# Patient Record
Sex: FEMALE | Race: BLACK OR AFRICAN AMERICAN | NOT HISPANIC OR LATINO
[De-identification: names, ages, dates, MRNs, and addresses within clinical notes are randomized per-mention and may not be internally consistent; named-entity substitution may affect disease eponyms.]

---

## 2017-01-26 ENCOUNTER — RESULT REVIEW (OUTPATIENT)
Age: 82
End: 2017-01-26

## 2017-01-30 ENCOUNTER — OUTPATIENT (OUTPATIENT)
Dept: OUTPATIENT SERVICES | Facility: HOSPITAL | Age: 82
LOS: 1 days | End: 2017-01-30

## 2017-01-30 DIAGNOSIS — Z98.89 OTHER SPECIFIED POSTPROCEDURAL STATES: Chronic | ICD-10-CM

## 2017-01-30 DIAGNOSIS — Z90.710 ACQUIRED ABSENCE OF BOTH CERVIX AND UTERUS: Chronic | ICD-10-CM

## 2017-01-30 DIAGNOSIS — Z00.8 ENCOUNTER FOR OTHER GENERAL EXAMINATION: ICD-10-CM

## 2017-02-03 ENCOUNTER — APPOINTMENT (OUTPATIENT)
Dept: MAMMOGRAPHY | Facility: IMAGING CENTER | Age: 82
End: 2017-02-03

## 2017-02-03 ENCOUNTER — OUTPATIENT (OUTPATIENT)
Dept: OUTPATIENT SERVICES | Facility: HOSPITAL | Age: 82
LOS: 1 days | End: 2017-02-03
Payer: MEDICARE

## 2017-02-03 VITALS
SYSTOLIC BLOOD PRESSURE: 150 MMHG | RESPIRATION RATE: 16 BRPM | HEIGHT: 68 IN | HEART RATE: 68 BPM | WEIGHT: 153 LBS | TEMPERATURE: 98 F | DIASTOLIC BLOOD PRESSURE: 80 MMHG

## 2017-02-03 DIAGNOSIS — C50.411 MALIGNANT NEOPLASM OF UPPER-OUTER QUADRANT OF RIGHT FEMALE BREAST: ICD-10-CM

## 2017-02-03 DIAGNOSIS — Z90.710 ACQUIRED ABSENCE OF BOTH CERVIX AND UTERUS: Chronic | ICD-10-CM

## 2017-02-03 DIAGNOSIS — M20.40 OTHER HAMMER TOE(S) (ACQUIRED), UNSPECIFIED FOOT: Chronic | ICD-10-CM

## 2017-02-03 DIAGNOSIS — Z98.89 OTHER SPECIFIED POSTPROCEDURAL STATES: Chronic | ICD-10-CM

## 2017-02-03 DIAGNOSIS — N63 UNSPECIFIED LUMP IN BREAST: ICD-10-CM

## 2017-02-03 DIAGNOSIS — C50.919 MALIGNANT NEOPLASM OF UNSPECIFIED SITE OF UNSPECIFIED FEMALE BREAST: ICD-10-CM

## 2017-02-03 LAB
ALBUMIN SERPL ELPH-MCNC: 3.8 G/DL — SIGNIFICANT CHANGE UP (ref 3.3–5)
ALP SERPL-CCNC: 64 U/L — SIGNIFICANT CHANGE UP (ref 40–120)
ALT FLD-CCNC: 28 U/L — SIGNIFICANT CHANGE UP (ref 4–33)
AST SERPL-CCNC: 19 U/L — SIGNIFICANT CHANGE UP (ref 4–32)
BILIRUB SERPL-MCNC: 0.6 MG/DL — SIGNIFICANT CHANGE UP (ref 0.2–1.2)
BUN SERPL-MCNC: 14 MG/DL — SIGNIFICANT CHANGE UP (ref 7–23)
CALCIUM SERPL-MCNC: 9.5 MG/DL — SIGNIFICANT CHANGE UP (ref 8.4–10.5)
CHLORIDE SERPL-SCNC: 101 MMOL/L — SIGNIFICANT CHANGE UP (ref 98–107)
CO2 SERPL-SCNC: 28 MMOL/L — SIGNIFICANT CHANGE UP (ref 22–31)
CREAT SERPL-MCNC: 0.75 MG/DL — SIGNIFICANT CHANGE UP (ref 0.5–1.3)
GLUCOSE SERPL-MCNC: 70 MG/DL — SIGNIFICANT CHANGE UP (ref 70–99)
HCT VFR BLD CALC: 37.6 % — SIGNIFICANT CHANGE UP (ref 34.5–45)
HGB BLD-MCNC: 12 G/DL — SIGNIFICANT CHANGE UP (ref 11.5–15.5)
MCHC RBC-ENTMCNC: 27.5 PG — SIGNIFICANT CHANGE UP (ref 27–34)
MCHC RBC-ENTMCNC: 31.9 % — LOW (ref 32–36)
MCV RBC AUTO: 86 FL — SIGNIFICANT CHANGE UP (ref 80–100)
PLATELET # BLD AUTO: 245 K/UL — SIGNIFICANT CHANGE UP (ref 150–400)
PMV BLD: 12.4 FL — SIGNIFICANT CHANGE UP (ref 7–13)
POTASSIUM SERPL-MCNC: 4.3 MMOL/L — SIGNIFICANT CHANGE UP (ref 3.5–5.3)
POTASSIUM SERPL-SCNC: 4.3 MMOL/L — SIGNIFICANT CHANGE UP (ref 3.5–5.3)
PROT SERPL-MCNC: 7.1 G/DL — SIGNIFICANT CHANGE UP (ref 6–8.3)
RBC # BLD: 4.37 M/UL — SIGNIFICANT CHANGE UP (ref 3.8–5.2)
RBC # FLD: 14.3 % — SIGNIFICANT CHANGE UP (ref 10.3–14.5)
SODIUM SERPL-SCNC: 141 MMOL/L — SIGNIFICANT CHANGE UP (ref 135–145)
WBC # BLD: 6.3 K/UL — SIGNIFICANT CHANGE UP (ref 3.8–10.5)
WBC # FLD AUTO: 6.3 K/UL — SIGNIFICANT CHANGE UP (ref 3.8–10.5)

## 2017-02-03 PROCEDURE — 19281 PERQ DEVICE BREAST 1ST IMAG: CPT

## 2017-02-03 PROCEDURE — C1739: CPT

## 2017-02-03 PROCEDURE — 93010 ELECTROCARDIOGRAM REPORT: CPT

## 2017-02-03 RX ORDER — SODIUM CHLORIDE 9 MG/ML
1000 INJECTION, SOLUTION INTRAVENOUS
Qty: 0 | Refills: 0 | Status: DISCONTINUED | OUTPATIENT
Start: 2017-02-07 | End: 2017-02-22

## 2017-02-03 RX ORDER — ESOMEPRAZOLE MAGNESIUM 40 MG/1
1 CAPSULE, DELAYED RELEASE ORAL
Qty: 0 | Refills: 0 | COMMUNITY

## 2017-02-03 RX ORDER — MULTIVIT-MIN/FERROUS GLUCONATE 9 MG/15 ML
1 LIQUID (ML) ORAL
Qty: 0 | Refills: 0 | COMMUNITY

## 2017-02-03 RX ORDER — ATORVASTATIN CALCIUM 80 MG/1
1 TABLET, FILM COATED ORAL
Qty: 0 | Refills: 0 | COMMUNITY

## 2017-02-03 NOTE — H&P PST ADULT - NEGATIVE ENMT SYMPTOMS
no recurrent cold sores/no ear pain/no post-nasal discharge/no dysphagia/no nasal discharge/no nasal congestion/no throat pain/no dry mouth/no nasal obstruction/no tinnitus/no abnormal taste sensation/no sinus symptoms/no vertigo/no gum bleeding/no nose bleeds/no hearing difficulty

## 2017-02-03 NOTE — H&P PST ADULT - HISTORY OF PRESENT ILLNESS
81 yrs old female with h/o malignant neoplasm of right breast and ? mass in the left breast presents for preop eval to have 81 yrs old female with h/o malignant neoplasm of right breast and ? mass in the left breast presents for preop eval to have mammo localization, right breast lumpectomy of calcification,  and right breast excision of papilloma x 2 ; mammo localization of left breast, and excision of atypical lobular hyperplasia and right breast sentinel lymph node biopsy on 2/7/17. Pt stated that routine mammo in Nov 2015 showed abnormal finding in bilat breast. Pt had sono and biopsies and now going for surgery

## 2017-02-03 NOTE — H&P PST ADULT - NEGATIVE NEUROLOGICAL SYMPTOMS
no confusion/no hemiparesis/no difficulty walking/no facial palsy/no headache/no transient paralysis

## 2017-02-03 NOTE — H&P PST ADULT - NSANTHOSAYNRD_GEN_A_CORE
No. LIZETH screening performed.  STOP BANG Legend: 0-2 = LOW Risk; 3-4 = INTERMEDIATE Risk; 5-8 = HIGH Risk/never tested

## 2017-02-03 NOTE — H&P PST ADULT - PROBLEM SELECTOR PLAN 1
scheduled for mammo localization, right breast lumpectomy of calcification,  and right breast excision of papilloma x 2 ; mammo localization of left breast, and excision of atypical lobular hyperplasia and right breast sentinel lymph node biopsy on 2/7/17.  labs pending   EKG in chart. Preop instructions provided to pt.

## 2017-02-03 NOTE — H&P PST ADULT - PMH
GERD (gastroesophageal reflux disease)    Hammer toes of both feet    HLD (hyperlipidemia)    Malignant neoplasm of breast  right breast  Osteoarthritis    Uterine leiomyoma

## 2017-02-03 NOTE — H&P PST ADULT - ASSESSMENT
81 yrs old female with h/o malignant neoplasm of right breast and ? mass in the left breast presents for preop eval to have mammo localization, right breast lumpectomy of calcification,  and right breast excision of papilloma x 2 ; mammo localization of left breast, and excision of atypical lobular hyperplasia and right breast sentinel lymph node biopsy on 2/7/17.

## 2017-02-03 NOTE — H&P PST ADULT - PSH
Acquired hammer toe  had surgery bilat feet in 2002 and 2003  H/O hammer toe correction    H/O total hysterectomy  1985  History of hysterectomy

## 2017-02-06 ENCOUNTER — RESULT REVIEW (OUTPATIENT)
Age: 82
End: 2017-02-06

## 2017-02-07 ENCOUNTER — APPOINTMENT (OUTPATIENT)
Dept: NUCLEAR MEDICINE | Facility: HOSPITAL | Age: 82
End: 2017-02-07

## 2017-02-07 ENCOUNTER — OUTPATIENT (OUTPATIENT)
Dept: OUTPATIENT SERVICES | Facility: HOSPITAL | Age: 82
LOS: 1 days | Discharge: HOME | End: 2017-02-07
Payer: MEDICARE

## 2017-02-07 VITALS
HEART RATE: 57 BPM | RESPIRATION RATE: 16 BRPM | SYSTOLIC BLOOD PRESSURE: 157 MMHG | TEMPERATURE: 98 F | OXYGEN SATURATION: 100 % | DIASTOLIC BLOOD PRESSURE: 57 MMHG

## 2017-02-07 VITALS
WEIGHT: 153 LBS | RESPIRATION RATE: 15 BRPM | HEART RATE: 61 BPM | SYSTOLIC BLOOD PRESSURE: 170 MMHG | DIASTOLIC BLOOD PRESSURE: 93 MMHG | OXYGEN SATURATION: 96 % | HEIGHT: 68 IN | TEMPERATURE: 98 F

## 2017-02-07 DIAGNOSIS — M20.40 OTHER HAMMER TOE(S) (ACQUIRED), UNSPECIFIED FOOT: Chronic | ICD-10-CM

## 2017-02-07 DIAGNOSIS — C50.411 MALIGNANT NEOPLASM OF UPPER-OUTER QUADRANT OF RIGHT FEMALE BREAST: ICD-10-CM

## 2017-02-07 DIAGNOSIS — Z98.89 OTHER SPECIFIED POSTPROCEDURAL STATES: Chronic | ICD-10-CM

## 2017-02-07 DIAGNOSIS — Z90.710 ACQUIRED ABSENCE OF BOTH CERVIX AND UTERUS: Chronic | ICD-10-CM

## 2017-02-07 PROCEDURE — 88307 TISSUE EXAM BY PATHOLOGIST: CPT | Mod: 26

## 2017-02-07 PROCEDURE — 76098 X-RAY EXAM SURGICAL SPECIMEN: CPT | Mod: 26,76,GC

## 2017-02-07 PROCEDURE — 88305 TISSUE EXAM BY PATHOLOGIST: CPT | Mod: 26

## 2017-02-07 PROCEDURE — 88342 IMHCHEM/IMCYTCHM 1ST ANTB: CPT | Mod: 26

## 2017-02-07 PROCEDURE — 88331 PATH CONSLTJ SURG 1 BLK 1SPC: CPT | Mod: 26

## 2017-02-07 RX ORDER — ONDANSETRON 8 MG/1
4 TABLET, FILM COATED ORAL ONCE
Qty: 0 | Refills: 0 | Status: COMPLETED | OUTPATIENT
Start: 2017-02-07 | End: 2017-02-07

## 2017-02-07 RX ORDER — ASPIRIN/CALCIUM CARB/MAGNESIUM 324 MG
1 TABLET ORAL
Qty: 0 | Refills: 0 | COMMUNITY

## 2017-02-07 RX ORDER — HYDROMORPHONE HYDROCHLORIDE 2 MG/ML
1 INJECTION INTRAMUSCULAR; INTRAVENOUS; SUBCUTANEOUS
Qty: 0 | Refills: 0 | Status: DISCONTINUED | OUTPATIENT
Start: 2017-02-07 | End: 2017-02-07

## 2017-02-07 RX ORDER — FENTANYL CITRATE 50 UG/ML
25 INJECTION INTRAVENOUS
Qty: 0 | Refills: 0 | Status: DISCONTINUED | OUTPATIENT
Start: 2017-02-07 | End: 2017-02-07

## 2017-02-07 RX ADMIN — ONDANSETRON 4 MILLIGRAM(S): 8 TABLET, FILM COATED ORAL at 10:52

## 2017-02-07 RX ADMIN — SODIUM CHLORIDE 30 MILLILITER(S): 9 INJECTION, SOLUTION INTRAVENOUS at 10:00

## 2017-02-07 NOTE — ASU DISCHARGE PLAN (ADULT/PEDIATRIC). - NOTIFY
Numbness, color, or temperature change to extremity/Fever greater than 101/Bleeding that does not stop/Swelling that continues Swelling that continues/Fever greater than 101/Persistent Nausea and Vomiting/Bleeding that does not stop/Numbness, color, or temperature change to extremity/Unable to Urinate

## 2017-02-07 NOTE — ASU DISCHARGE PLAN (ADULT/PEDIATRIC). - POST OP PHONE #
[phone] pt. granted permission to leave message /and or speak with whoever answers the phone.

## 2017-02-07 NOTE — ASU DISCHARGE PLAN (ADULT/PEDIATRIC). - MEDICATION SUMMARY - MEDICATIONS TO TAKE
I will START or STAY ON the medications listed below when I get home from the hospital:    atorvastatin 10 mg oral tablet  -- 1 tab(s) by mouth once a day  -- Indication: For HLD    NexIUM 20 mg oral delayed release capsule  -- 1 cap(s) by mouth once a day  -- Indication: For Proton pump inhibitor    Centrum Silver oral tablet  -- 1 tab(s) by mouth once a day  -- Indication: For supplement    Citracal 250 mg + D 200 intl units oral tablet  -- 1 tab(s) by mouth once a day  -- Indication: For supplement

## 2017-02-07 NOTE — ASU DISCHARGE PLAN (ADULT/PEDIATRIC). - SPECIAL INSTRUCTIONS
Please follow up with Dr. Cuba February 27th, call office for appointment [phone]. Please stop taking ASA for one week. Call Dr. Cuba's office prior to re-starting.

## 2017-02-07 NOTE — ASU DISCHARGE PLAN (ADULT/PEDIATRIC). - ITEMS TO FOLLOWUP WITH YOUR PHYSICIAN'S
Please follow up with Dr. Cuba February 27th, call office for appointment [phone]. Please stop taking ASA for one week. Call Dr. Cuba's office prior to re-starting.

## 2017-02-14 LAB — SURGICAL PATHOLOGY STUDY: SIGNIFICANT CHANGE UP

## 2017-02-19 ENCOUNTER — TRANSCRIPTION ENCOUNTER (OUTPATIENT)
Age: 82
End: 2017-02-19

## 2017-07-11 ENCOUNTER — APPOINTMENT (OUTPATIENT)
Dept: GASTROENTEROLOGY | Facility: CLINIC | Age: 82
End: 2017-07-11
Payer: MEDICARE

## 2017-07-11 PROCEDURE — 99214 OFFICE O/P EST MOD 30 MIN: CPT

## 2017-07-11 PROCEDURE — 82272 OCCULT BLD FECES 1-3 TESTS: CPT

## 2017-09-12 ENCOUNTER — APPOINTMENT (OUTPATIENT)
Dept: GASTROENTEROLOGY | Facility: CLINIC | Age: 82
End: 2017-09-12
Payer: MEDICARE

## 2017-09-12 PROCEDURE — 91110 GI TRC IMG INTRAL ESOPH-ILE: CPT

## 2017-09-13 ENCOUNTER — APPOINTMENT (OUTPATIENT)
Dept: GASTROENTEROLOGY | Facility: CLINIC | Age: 82
End: 2017-09-13
Payer: MEDICARE

## 2017-09-13 PROCEDURE — 99214 OFFICE O/P EST MOD 30 MIN: CPT

## 2019-09-05 ENCOUNTER — EMERGENCY (EMERGENCY)
Facility: HOSPITAL | Age: 84
LOS: 1 days | Discharge: HOME | End: 2019-09-05
Attending: STUDENT IN AN ORGANIZED HEALTH CARE EDUCATION/TRAINING PROGRAM | Admitting: EMERGENCY MEDICINE
Payer: MEDICARE

## 2019-09-05 VITALS
RESPIRATION RATE: 18 BRPM | HEART RATE: 56 BPM | DIASTOLIC BLOOD PRESSURE: 69 MMHG | OXYGEN SATURATION: 100 % | SYSTOLIC BLOOD PRESSURE: 154 MMHG | TEMPERATURE: 98 F

## 2019-09-05 DIAGNOSIS — Z90.710 ACQUIRED ABSENCE OF BOTH CERVIX AND UTERUS: Chronic | ICD-10-CM

## 2019-09-05 DIAGNOSIS — M20.40 OTHER HAMMER TOE(S) (ACQUIRED), UNSPECIFIED FOOT: Chronic | ICD-10-CM

## 2019-09-05 DIAGNOSIS — Z98.89 OTHER SPECIFIED POSTPROCEDURAL STATES: Chronic | ICD-10-CM

## 2019-09-05 PROBLEM — D25.9 LEIOMYOMA OF UTERUS, UNSPECIFIED: Chronic | Status: ACTIVE | Noted: 2017-02-03

## 2019-09-05 PROBLEM — C50.919 MALIGNANT NEOPLASM OF UNSPECIFIED SITE OF UNSPECIFIED FEMALE BREAST: Chronic | Status: ACTIVE | Noted: 2017-02-03

## 2019-09-05 PROBLEM — M19.90 UNSPECIFIED OSTEOARTHRITIS, UNSPECIFIED SITE: Chronic | Status: ACTIVE | Noted: 2017-02-03

## 2019-09-05 PROBLEM — M20.41 OTHER HAMMER TOE(S) (ACQUIRED), RIGHT FOOT: Chronic | Status: ACTIVE | Noted: 2017-02-03

## 2019-09-05 PROBLEM — K21.9 GASTRO-ESOPHAGEAL REFLUX DISEASE WITHOUT ESOPHAGITIS: Chronic | Status: ACTIVE | Noted: 2017-02-03

## 2019-09-05 LAB
ALBUMIN SERPL ELPH-MCNC: 4 G/DL — SIGNIFICANT CHANGE UP (ref 3.3–5)
ALP SERPL-CCNC: 72 U/L — SIGNIFICANT CHANGE UP (ref 40–120)
ALT FLD-CCNC: 17 U/L — SIGNIFICANT CHANGE UP (ref 4–33)
ANION GAP SERPL CALC-SCNC: 9 MMO/L — SIGNIFICANT CHANGE UP (ref 7–14)
APTT BLD: 31.8 SEC — SIGNIFICANT CHANGE UP (ref 27.5–36.3)
AST SERPL-CCNC: 14 U/L — SIGNIFICANT CHANGE UP (ref 4–32)
BILIRUB SERPL-MCNC: 0.7 MG/DL — SIGNIFICANT CHANGE UP (ref 0.2–1.2)
BUN SERPL-MCNC: 14 MG/DL — SIGNIFICANT CHANGE UP (ref 7–23)
CALCIUM SERPL-MCNC: 9.8 MG/DL — SIGNIFICANT CHANGE UP (ref 8.4–10.5)
CHLORIDE SERPL-SCNC: 105 MMOL/L — SIGNIFICANT CHANGE UP (ref 98–107)
CO2 SERPL-SCNC: 28 MMOL/L — SIGNIFICANT CHANGE UP (ref 22–31)
CREAT SERPL-MCNC: 0.76 MG/DL — SIGNIFICANT CHANGE UP (ref 0.5–1.3)
GLUCOSE SERPL-MCNC: 112 MG/DL — HIGH (ref 70–99)
HCT VFR BLD CALC: 39.4 % — SIGNIFICANT CHANGE UP (ref 34.5–45)
HGB BLD-MCNC: 12.4 G/DL — SIGNIFICANT CHANGE UP (ref 11.5–15.5)
INR BLD: 1.01 — SIGNIFICANT CHANGE UP (ref 0.88–1.17)
MCHC RBC-ENTMCNC: 27.1 PG — SIGNIFICANT CHANGE UP (ref 27–34)
MCHC RBC-ENTMCNC: 31.5 % — LOW (ref 32–36)
MCV RBC AUTO: 86 FL — SIGNIFICANT CHANGE UP (ref 80–100)
NRBC # FLD: 0 K/UL — SIGNIFICANT CHANGE UP (ref 0–0)
PLATELET # BLD AUTO: 230 K/UL — SIGNIFICANT CHANGE UP (ref 150–400)
PMV BLD: 11 FL — SIGNIFICANT CHANGE UP (ref 7–13)
POTASSIUM SERPL-MCNC: 4.3 MMOL/L — SIGNIFICANT CHANGE UP (ref 3.5–5.3)
POTASSIUM SERPL-SCNC: 4.3 MMOL/L — SIGNIFICANT CHANGE UP (ref 3.5–5.3)
PROT SERPL-MCNC: 7.5 G/DL — SIGNIFICANT CHANGE UP (ref 6–8.3)
PROTHROM AB SERPL-ACNC: 11.5 SEC — SIGNIFICANT CHANGE UP (ref 9.8–13.1)
RBC # BLD: 4.58 M/UL — SIGNIFICANT CHANGE UP (ref 3.8–5.2)
RBC # FLD: 14.6 % — HIGH (ref 10.3–14.5)
SODIUM SERPL-SCNC: 142 MMOL/L — SIGNIFICANT CHANGE UP (ref 135–145)
TROPONIN T, HIGH SENSITIVITY: 8 NG/L — SIGNIFICANT CHANGE UP (ref ?–14)
TROPONIN T, HIGH SENSITIVITY: 8 NG/L — SIGNIFICANT CHANGE UP (ref ?–14)
WBC # BLD: 5.33 K/UL — SIGNIFICANT CHANGE UP (ref 3.8–10.5)
WBC # FLD AUTO: 5.33 K/UL — SIGNIFICANT CHANGE UP (ref 3.8–10.5)

## 2019-09-05 PROCEDURE — 99284 EMERGENCY DEPT VISIT MOD MDM: CPT | Mod: GC

## 2019-09-05 PROCEDURE — 71046 X-RAY EXAM CHEST 2 VIEWS: CPT | Mod: 26

## 2019-09-05 RX ORDER — ASPIRIN/CALCIUM CARB/MAGNESIUM 324 MG
325 TABLET ORAL ONCE
Refills: 0 | Status: COMPLETED | OUTPATIENT
Start: 2019-09-05 | End: 2019-09-05

## 2019-09-05 RX ADMIN — Medication 325 MILLIGRAM(S): at 15:22

## 2019-09-05 NOTE — ED PROVIDER NOTE - CLINICAL SUMMARY MEDICAL DECISION MAKING FREE TEXT BOX
84 year old female pmhx GERD, breast CA, HLD presents with burning sensation in the middle of her chest lasting for approximately 1 minute. EKG concerning for [facility]. Will evaluate with labs, trop, CXR,  aspirin, cardiology consult and reassess patient.

## 2019-09-05 NOTE — ED ADULT NURSE NOTE - NSIMPLEMENTINTERV_GEN_ALL_ED
Implemented All Universal Safety Interventions:  [place] to call system. Call bell, personal items and telephone within reach. Instruct patient to call for assistance. Room bathroom lighting operational. Non-slip footwear when patient is off stretcher. Physically safe environment: no spills, clutter or unnecessary equipment. Stretcher in lowest position, wheels locked, appropriate side rails in place.

## 2019-09-05 NOTE — ED PROVIDER NOTE - NSFOLLOWUPCLINICS_GEN_ALL_ED_FT
[facility]  Cardiology  [address] [place] [id number]  Phone: [phone]  Fax:   Follow Up Time:

## 2019-09-05 NOTE — ED PROVIDER NOTE - TEMPLATE, MLM
Cardiac Implemented All Universal Safety Interventions:  [place] to call system. Call bell, personal items and telephone within reach. Instruct patient to call for assistance. Room bathroom lighting operational. Non-slip footwear when patient is off stretcher. Physically safe environment: no spills, clutter or unnecessary equipment. Stretcher in lowest position, wheels locked, appropriate side rails in place.

## 2019-09-05 NOTE — ED PROVIDER NOTE - NSFOLLOWUPINSTRUCTIONS_ED_ALL_ED_FT
Please follow up with your primary care physician in 24-48 hours  A copy of your results have been provided to you  A referral to Cardiology have been provided to you  Please come back if any of the following: Chest pain, shortness of breath, nausea, vomiting, headache, changes in vision or any major concern

## 2019-09-05 NOTE — ED PROVIDER NOTE - PATIENT PORTAL LINK FT
You can access the FollowMyHealth Patient Portal offered by [facility] by registering at the following website: [email or web address]. By joining [facility]’s FollowMyHealth portal, you will also be able to view your health information using other applications (apps) compatible with our system.

## 2019-09-05 NOTE — ED ADULT TRIAGE NOTE - CHIEF COMPLAINT QUOTE
States she felt tired this morning (which is not normal for her) and went to lay down. When laying down x 30 mins ago she had a "warmth" across chest. Denies sob, dizziness, nausea, vomiting. Pt states she saw cardiologist x 2 weeks ago for echo and stress test, told she has a "weak muscle" and ekg was abnormal. Did not send to ED at that time.

## 2019-09-05 NOTE — ED PROVIDER NOTE - ATTENDING CONTRIBUTION TO CARE
84 year old female pmhx GERD, breast CA, HLD presents with burning sensation in the middle of her chest lasting for approximately 1 minute. EKG concerning for [facility]. Will evaluate with labs, trop, CXR,  aspirin, cardiology consult and reassess patient.    RADHA Palmer: I have personally performed a face to face bedside history and physical examination of this patient. I have discussed the history, examination, review of systems, assessment and plan of management with the resident. I have reviewed the electronic medical record and amended it to reflect my history, review of systems, physical exam, assessment and plan.

## 2019-09-05 NOTE — ED PROVIDER NOTE - OBJECTIVE STATEMENT
84 year old female pmhx GERD, breast CA, HLD presents with burning non radiating sensation in the middle of her chest lasting for approximately 1 minute.  Pt took Aspirin while experiencing this sensation and is unaware of effectiveness. Pt denies any SOB, n/v, diaphoresis, HA. Pt was evaluated by her cardiologist with a stress test and echocardiogram 2 weeks ago who stated she had "heart muscle weakness."

## 2019-09-05 NOTE — ED ADULT NURSE NOTE - OBJECTIVE STATEMENT
84F presents with epigastric "burning" pain starting this morning. Pt took 162 asa at home, denies current chest pain, SOB, dizziness.

## 2019-09-05 NOTE — CONSULT NOTE ADULT - SUBJECTIVE AND OBJECTIVE BOX
HISTORY OF PRESENT ILLNESS:  83 YO F hx of chronic neck pain, GI bleed 2/2 Gastric Ulcer, GERD,      Allergies    No Known Allergies    Intolerances    	    MEDICATIONS:        aspirin 325 milliGRAM(s) Oral Once            PAST MEDICAL & SURGICAL HISTORY:  Osteoarthritis  Hammer toes of both feet  Malignant neoplasm of breast: right breast  Uterine leiomyoma  GERD (gastroesophageal reflux disease)  HLD (hyperlipidemia)  Acquired hammer toe: had surgery bilat feet in 2002 and 2003  H/O total hysterectomy: 1985  History of hysterectomy  H/O hammer toe correction      FAMILY HISTORY:  No pertinent family history in first degree relatives      SOCIAL HISTORY:    [ ] Non-smoker  [ ] Smoker  [ ] Alcohol      REVIEW OF SYSTEMS:  General: no fatigue/malaise, weight loss/gain.  Skin: no rashes.  Ophthalmologic: no blurred vision, no loss of vision. 	  ENT: no sore throat, rhinorrhea, sinus congestion.  Respiratory: no SOB, cough or wheeze.  Gastrointestinal:  no N/V/D, no melena/hematemesis/hematochezia.  Genitourinary: no dysuria/hesitancy or hematuria.  Musculoskeletal: no myalgias or arthralgias.  Neurological: no changes in vision or hearing, no lightheadedness/dizziness, no syncope/near syncope	  Psychiatric: no unusual stress/anxiety.   Hematology/Lymphatics: no unusual bleeding, bruising and no lymphadenopathy.  Endocrine: no unusual thirst.   All others negative except as stated above and in HPI.    PHYSICAL EXAM:  T(C): 36.4 (09-05-19 @ 13:41), Max: 36.4 (09-05-19 @ 13:41)  HR: 56 (09-05-19 @ 13:41) (56 - 56)  BP: 154/69 (09-05-19 @ 13:41) (154/69 - 154/69)  RR: 18 (09-05-19 @ 13:41) (18 - 18)  SpO2: 100% (09-05-19 @ 13:41) (100% - 100%)  Wt(kg): --  I&O's Summary      Appearance: Normal	  HEENT:   Normal oral mucosa, PERRL, EOMI	  Lymphatic: No lymphadenopathy  Cardiovascular: Normal S1 S2, No JVD, No murmurs, No edema  Respiratory: Lungs clear to auscultation	  Psychiatry: A & O x 3, Mood & affect appropriate  Gastrointestinal:  Soft, Non-tender, + BS	  Skin: No rashes, No ecchymoses, No cyanosis	  Neurologic: Non-focal  Extremities: Normal range of motion, No clubbing, cyanosis or edema  Vascular: Peripheral pulses palpable 2+ bilaterally        LABS:	 	              proBNP:   Lipid Profile:   HgA1c:   TSH:       CARDIAC MARKERS:            TELEMETRY: 	    ECG:  	  RADIOLOGY:  OTHER: 	    PREVIOUS DIAGNOSTIC TESTING:    [ ] Echocardiogram:  [ ]  Catheterization:  [ ] Stress Test:  	  	  ASSESSMENT/PLAN: 	      Mark Kilgore  Cardiology Fellow  [phone]  All Cardiology service information can be found 24/7 on [email or web address], password: [place] HISTORY OF PRESENT ILLNESS:  85 YO F hx of chronic neck pain, GI bleed 2/2 Gastric Ulcer, GERD,  presenting with burning chest pain that started today around noon. Resolving by the time patient arrived in the ED. Pt. said does not feel like her typical GERD. No nausea, vomiting, diaphoresis. No shortness of breath. No recent travel, sick contacts or hx of PE/DVT. No numbness, tingling or weakness. No other complaints. Of note, pt. states about a month ago had abnormal EKG, had follow up exercise nuclear stress test that was normal 2 weeks ago. No hx of cath.     Allergies    No Known Allergies    Intolerances    	    MEDICATIONS:        aspirin 325 milliGRAM(s) Oral Once            PAST MEDICAL & SURGICAL HISTORY:  Osteoarthritis  Hammer toes of both feet  Malignant neoplasm of breast: right breast  Uterine leiomyoma  GERD (gastroesophageal reflux disease)  HLD (hyperlipidemia)  Acquired hammer toe: had surgery bilat feet in 2002 and 2003  H/O total hysterectomy: 1985  History of hysterectomy  H/O hammer toe correction      FAMILY HISTORY:  No pertinent family history in first degree relatives      SOCIAL HISTORY:    [x] Non-smoker  [ ] Smoker  [ ] Alcohol      REVIEW OF SYSTEMS:  General: no fatigue/malaise, weight loss/gain.  Skin: no rashes.  Ophthalmologic: no blurred vision, no loss of vision. 	  ENT: no sore throat, rhinorrhea, sinus congestion.  Respiratory: no SOB, cough or wheeze.  Gastrointestinal:  no N/V/D, no melena/hematemesis/hematochezia.  Genitourinary: no dysuria/hesitancy or hematuria.  Musculoskeletal: no myalgias or arthralgias.  Neurological: no changes in vision or hearing, no lightheadedness/dizziness, no syncope/near syncope	  Psychiatric: no unusual stress/anxiety.   Hematology/Lymphatics: no unusual bleeding, bruising and no lymphadenopathy.  Endocrine: no unusual thirst.   All others negative except as stated above and in HPI.    PHYSICAL EXAM:  T(C): 36.4 (09-05-19 @ 13:41), Max: 36.4 (09-05-19 @ 13:41)  HR: 56 (09-05-19 @ 13:41) (56 - 56)  BP: 154/69 (09-05-19 @ 13:41) (154/69 - 154/69)  RR: 18 (09-05-19 @ 13:41) (18 - 18)  SpO2: 100% (09-05-19 @ 13:41) (100% - 100%)  Wt(kg): --  I&O's Summary      Appearance: Normal	  HEENT:   Normal oral mucosa, PERRL, EOMI	  Lymphatic: No lymphadenopathy  Cardiovascular: Normal S1 S2, No JVD, No murmurs, No edema  Respiratory: Lungs clear to auscultation	  Psychiatry: A & O x 3, Mood & affect appropriate  Gastrointestinal:  Soft, Non-tender, + BS	  Skin: No rashes, No ecchymoses, No cyanosis	  Neurologic: Non-focal  Extremities: Normal range of motion, No clubbing, cyanosis or edema  Vascular: Peripheral pulses palpable 2+ bilaterally        LABS:	 	  Pending            proBNP:   Lipid Profile:   HgA1c:   TSH:       CARDIAC MARKERS:            TELEMETRY: 	    ECG:  	  RADIOLOGY:  OTHER: 	    PREVIOUS DIAGNOSTIC TESTING:    [ ] Echocardiogram:  [ ]  Catheterization:  [ ] Stress Test:  	    ASSESSMENT/PLAN: 	  85 YO F with burning chest pain started today that has since resolved. Exam unremarkable. EKG notable for T wave inversions in V1-V6 with ?LVH.     EKG likely chronic from 1 month ago  Would call PMD for stress and recent echo results  f/u labs including troponin  Monitor on tele  with hx of GI bleed and low suspicion for ACS would not anticoagulate at this time.       Mark Kilgore  Cardiology Fellow  [phone]  All Cardiology service information can be found 24/7 on [email or web address], password: [email or web address]

## 2022-05-25 NOTE — ASU PREOP CHECKLIST - HEIGHT IN INCHES
8 Nellie Sandifer, MD,  [facility] [address] [place]: [phone] or [facility]  Fax: [phone]        =======================================================================================   =======================================================================================  SUBJECTIVE  Seen resting in bed  Remains on RA w/ COVID-19 (+)  No distress  Hypotension better     Past medical, Surgical, Social, Family medical history reviewed by me. MEDICATIONS: reviewed by me. Medications Prior to Admission:  No current facility-administered medications on file prior to encounter. Current Outpatient Medications on File Prior to Encounter   Medication Sig Dispense Refill    oxyCODONE-acetaminophen (PERCOCET)  MG per tablet Take 1 tablet by mouth every 8 hours as needed for Pain.       apixaban (ELIQUIS) 5 MG TABS tablet Take 1 tablet by mouth 2 times daily 60 tablet 1    ferrous sulfate (IRON 325) 325 (65 Fe) MG tablet Take 325 mg by mouth daily (with breakfast)       Cholecalciferol (VITAMIN D3) 50 MCG (2000 UT) CAPS Take by mouth      Cholecalciferol (VITAMIN D3) 125 MCG (5000 UT) TABS Take by mouth      pantoprazole (PROTONIX) 40 MG tablet Take 1 tablet by mouth daily 30 tablet 0    loratadine (CLARITIN) 10 MG tablet Take 10 mg by mouth as needed       ondansetron (ZOFRAN) 4 MG tablet Take 1 tablet by mouth every 4 hours as needed for Nausea or Vomiting 15 tablet 0    atenolol (TENORMIN) 25 MG tablet Take 50 mg by mouth daily       ALPHAGAN P 0.1 % SOLN Place 1 drop into the left eye 3 times daily       dorzolamide (TRUSOPT) 2 % ophthalmic solution Place 1 drop into the right eye 3 times daily       lisinopril (PRINIVIL;ZESTRIL) 5 MG tablet Take 5 mg by mouth daily       simvastatin (ZOCOR) 40 MG tablet Take 40 mg by mouth nightly       zolpidem (AMBIEN) 5 MG tablet 2.5 mg.            Current Facility-Administered Medications:     brimonidine (ALPHAGAN) 0.2 % ophthalmic solution 1 drop, 1 drop, Right Eye, TID, Orly Aguirre MD, 1 drop at 05/25/22 0952    doxycycline hyclate (VIBRA-TABS) tablet 100 mg, 100 mg, Oral, 2 times per day, Niki Infante MD, 100 mg at 05/25/22 1235    ferrous sulfate (IRON 325) tablet 325 mg, 325 mg, Oral, Daily with breakfast, Niki Infante MD, 325 mg at 05/24/22 0816    cetirizine (ZYRTEC) tablet 5 mg, 5 mg, Oral, Daily, Niki Infante MD, 5 mg at 05/25/22 0944    oxyCODONE-acetaminophen (PERCOCET)  MG per tablet 1 tablet, 1 tablet, Oral, Q8H PRN, Niki Infante MD    dorzolamide (TRUSOPT) 2 % ophthalmic solution 1 drop, 1 drop, Right Eye, TID, Orly Aguirre MD, 1 drop at 05/25/22 0949    cefTRIAXone (ROCEPHIN) 1000 mg in sterile water 10 mL IV syringe, 1,000 mg, IntraVENous, Q24H, Kandice Waddell PA-C, 1,000 mg at 05/24/22 1544    sodium chloride flush 0.9 % injection 10 mL, 10 mL, IntraVENous, 2 times per day, Verl Klinefelter, MD, 10 mL at 05/25/22 0949    sodium chloride flush 0.9 % injection 10 mL, 10 mL, IntraVENous, PRN, Verl Klinefelter, MD    0.9 % sodium chloride infusion, , IntraVENous, PRN, Verl Klinefelter, MD    potassium chloride (KLOR-CON M) extended release tablet 40 mEq, 40 mEq, Oral, PRN, 40 mEq at 05/24/22 1259 **OR** potassium bicarb-citric acid (EFFER-K) effervescent tablet 40 mEq, 40 mEq, Oral, PRN **OR** potassium chloride 10 mEq/100 mL IVPB (Peripheral Line), 10 mEq, IntraVENous, PRN, Verl Klinefelter, MD    potassium chloride 10 mEq/100 mL IVPB (Peripheral Line), 10 mEq, IntraVENous, PRN, Verl Klinefelter, MD    magnesium sulfate 2000 mg in 50 mL IVPB premix, 2,000 mg, IntraVENous, PRN, Verl Klinefelter, MD    promethazine (PHENERGAN) tablet 12.5 mg, 12.5 mg, Oral, Q6H PRN **OR** [DISCONTINUED] ondansetron (ZOFRAN) injection 4 mg, 4 mg, IntraVENous, Q6H PRN, Verl Klinefelter, MD    magnesium hydroxide (MILK OF MAGNESIA) 400 MG/5ML suspension 30 mL, 30 mL, Oral, Daily PRN, Verl Klinefelter, MD [place] acetaminophen (TYLENOL) tablet 650 mg, 650 mg, Oral, Q6H PRN **OR** acetaminophen (TYLENOL) suppository 650 mg, 650 mg, Rectal, Q6H PRN, Tatyana Samaniego MD    apixaban (ELIQUIS) tablet 5 mg, 5 mg, Oral, BID, Tatyana Samaniego MD, 5 mg at 05/25/22 0943    atenolol (TENORMIN) tablet 50 mg, 50 mg, Oral, Daily, Tatyana Samaniego MD, 50 mg at 05/25/22 0942    lisinopril (PRINIVIL;ZESTRIL) tablet 5 mg, 5 mg, Oral, Daily, Tatyana Samaniego MD, 5 mg at 05/25/22 0943    pantoprazole (PROTONIX) tablet 40 mg, 40 mg, Oral, QAM AC, Lazaro Murray MD, 40 mg at 05/25/22 0548    atorvastatin (LIPITOR) tablet 20 mg, 20 mg, Oral, Nightly, Lazaro Murray MD, 20 mg at 05/24/22 2049    zolpidem (AMBIEN) tablet 2.5 mg, 2.5 mg, Oral, Nightly PRN, Tatyana Samaniego MD, 2.5 mg at 05/23/22 2159       =======================================================================================     PHYSICAL EXAM:  Recent vital signs and recent I/Os reviewed by me. Wt Readings from Last 3 Encounters:   05/25/22 132 lb 15 oz (60.3 kg)   04/15/22 124 lb 11.2 oz (56.6 kg)   02/15/22 121 lb 4.1 oz (55 kg)     BP Readings from Last 3 Encounters:   05/25/22 130/82   04/22/22 121/77   03/02/22 (!) 144/71     Patient Vitals for the past 24 hrs:   BP Temp Temp src Pulse Resp SpO2 Weight   05/25/22 0800 130/82 97.1 °F (36.2 °C) Temporal 78 16 95 % --   05/25/22 0400 (!) 131/96 98.6 °F (37 °C) Temporal 83 16 100 % 132 lb 15 oz (60.3 kg)   05/25/22 0000 127/73 98.3 °F (36.8 °C) Temporal 76 16 98 % --   05/24/22 2000 114/76 98 °F (36.7 °C) Temporal 87 18 97 % --   05/24/22 1544 106/68 97.4 °F (36.3 °C) Temporal 78 16 97 % --   05/24/22 1200 106/66 97.6 °F (36.4 °C) Temporal 82 16 100 % --       Intake/Output Summary (Last 24 hours) at 5/25/2022 0959  Last data filed at 5/25/2022 0549  Gross per 24 hour   Intake 240 ml   Output 700 ml   Net -460 ml         Physical Exam  Vitals reviewed. Constitutional:       General: He is not in acute distress.   Remains on RA Appearance: Normal appearance. He is ill-appearing. HENT:      Head: Normocephalic and atraumatic. Right Ear: External ear normal.      Left Ear: External ear normal.      Nose: Nose normal.      Mouth/Throat:      Mouth: Mucous membranes are moist. Mucous membranes are not dry. Eyes:      General: No scleral icterus. Conjunctiva/sclera: Conjunctivae normal.   Neck:      Vascular: No JVD. Cardiovascular:      Rate and Rhythm: Normal rate and regular rhythm. Heart sounds: S1 normal and S2 normal.   Pulmonary:      Effort: Pulmonary effort is normal. No respiratory distress. Breath sounds: Rhonchi present. Abdominal:      General: Bowel sounds are normal. There is no distension. Musculoskeletal:         General: No swelling or deformity. Cervical back: Normal range of motion and neck supple. Skin:     General: Skin is dry. Coloration: Skin is not jaundiced. Neurological:      Mental Status: He is alert and oriented to person, place, and time. Mental status is at baseline. Psychiatric:         Mood and Affect: Mood normal.         Behavior: Behavior normal.          =======================================================================================     DATA:  Diagnostic tests reviewed by me for today's visit:   (AS NEEDED FOR MY EVALUATION AND MANAGEMENT).        Recent Labs     05/22/22  1335 05/23/22  0442   WBC 9.4 8.9   HCT 37.6* 36.9*    125*     Iron Saturation:  No components found for: PERCENTFE  FERRITIN:    Lab Results   Component Value Date    FERRITIN 29.2 02/01/2022     IRON:    Lab Results   Component Value Date    IRON 27 02/01/2022     TIBC:    Lab Results   Component Value Date    TIBC 267 02/01/2022       Recent Labs     05/22/22  1335 05/23/22  0442 05/24/22  0510 05/25/22  0542   * 134* 136 134*   K 3.8 3.9 3.4* 4.2   CL 99 104 104 104   CO2 15* 19* 21 21   BUN 22* 23* 21* 24*   CREATININE 1.0 0.8 0.9 0.8     Recent Labs     05/22/22  7130 05/23/22  0442 05/24/22  0510 05/25/22  0542   CALCIUM 8.5 8.6 8.3 8.6   MG  --   --  1.80  --    PHOS  --  3.0 2.8 2.3*     No results for input(s): PH, PCO2, PO2 in the last 72 hours.     Invalid input(s): Kayla Pettit    ABG:  No results found for: PH, PCO2, PO2, HCO3, BE, THGB, TCO2, O2SAT  VBG:  No results found for: PHVEN, CVQ4RYX, BEVEN, X2UNSHAB    LDH:    Lab Results   Component Value Date     02/02/2022     Uric Acid:    Lab Results   Component Value Date    LABURIC 3.6 05/22/2022       PT/INR:    Lab Results   Component Value Date    PROTIME 22.6 05/22/2022    INR 1.95 05/22/2022     Warfarin PT/INR:  No components found for: Carmela Davenport  PTT:    Lab Results   Component Value Date    APTT 38.4 05/22/2022   [APTT}  Last 3 Troponin:    Lab Results   Component Value Date    TROPONINI 0.02 05/22/2022    TROPONINI 0.03 02/01/2022    TROPONINI <0.01 04/28/2021       U/A:    Lab Results   Component Value Date    COLORU Yellow 05/24/2022    PROTEINU TRACE 05/24/2022    PHUR 6.5 05/24/2022    WBCUA 1 05/24/2022    RBCUA 1 05/24/2022    BACTERIA None Seen 05/24/2022    CLARITYU Clear 05/24/2022    SPECGRAV 1.015 05/24/2022    LEUKOCYTESUR Negative 05/24/2022    UROBILINOGEN 1.0 05/24/2022    BILIRUBINUR Negative 05/24/2022    BLOODU Negative 05/24/2022    GLUCOSEU Negative 05/24/2022     Microalbumen/Creatinine ratio:  No components found for: RUCREAT  24 Hour Urine for Protein:  No components found for: RAWUPRO, UHRS3, EHZB54HG, UTV3  24 Hour Urine for Creatinine Clearance:  No components found for: CREAT4, UHRS10, UTV10  Urine Toxicology:  No components found for: IAMMENTA, IBARBIT, IBENZO, ICOCAINE, IMARTHC, IOPIATES, IPHENCYC    HgBA1c:  No results found for: LABA1C  RPR:  No results found for: RPR  HIV:  No results found for: HIV  PORTER:  No results found for: ANATITER, PORTER  RF:  No results found for: RF  DSDNA:  No components found for: DNA  AMYLASE:    Lab Results   Component Value Date    AMYLASE 168 01/17/2018     LIPASE:    Lab Results   Component Value Date    LIPASE 24.0 05/22/2022     Fibrinogen Level:  No components found for: FIB       BELOW MENTIONED RADIOLOGY STUDY RESULTS BY ME (AS NEEDED FOR MY EVALUATION AND MANAGEMENT). XR CHEST PORTABLE    Result Date: 5/22/2022  EXAMINATION: ONE XRAY VIEW OF THE CHEST 5/22/2022 1:16 pm COMPARISON: February 1, 2022 HISTORY: ORDERING SYSTEM PROVIDED HISTORY: SOB TECHNOLOGIST PROVIDED HISTORY: Reason for exam:->SOB Reason for Exam: Fatigue ([facility] from home d/t weakness and frequent falls x 2 days. per EMS, also states some confusion. ) FINDINGS: The cardiomediastinal silhouette is mildly enlarged, stable. There is a right-sided port with distal tip at the cavoatrial junction. Patchy airspace disease involving the parahilar region of the right upper lobe is concerning for pneumonia. Left lung is clear. No pleural effusion or pneumothorax. 1. Suspected right upper lobe pneumonia. Radiographic follow-up recommended to ensure resolution. 2. Mild cardiomegaly, stable. New right-sided port with distal tip at the cavoatrial junction. CT CHEST ABDOMEN PELVIS W CONTRAST    Result Date: 5/19/2022  EXAM: CT CHEST ABDOMEN AND PELVIS INDICATION: Restaging. .Gastroesophageal cancer ([address] liver metastasis. Bone metastasis. COMPARISON: CT of the chest, abdomen, and pelvis 2/1/2022 and MRI of the right hip 3/31/2022. TECHNIQUE: Axial CT imaging obtained through the chest, abdomen and pelvis. Axial images and multiplanar reformatted images were reviewed. Up-to-date CT equipment and radiation dose reduction techniques were employed. IV Contrast: 80 mL Isovue-370. Oral Contrast: Yes. CT CHEST: Lungs and Pleura: Left upper lung pulmonary nodule axial image 18 has decreased in size compared to prior exam. Right upper lobe nodule axial image 26 has markedly decreased in size.  Bilobed nodule in the anterior left upper lobe axial image 28 appears unchanged. Dominant nodule in the right lower lobe superior segment has nearly completely resolved. The central nodule along the posterior aspect of the inferior left hilum has significantly decreased in size. There are new branching nodular opacities in the left lower lobe suggesting possible lymphangitic carcinomatosis or progressive metastatic disease. Representative nodule which is new and image 58 of series 601 measures 10 mm. Multiple nodules in the left lung base have markedly decreased in size. Representative nodule in the left lower lobe image 87 measures 13 x 15 mm, previously measuring 16 x 15 mm. No pleural effusion. Mediastinum: Markedly improved nodular thickening along the esophagus. A subcarinal lymph node and gastroesophageal lymph nodes are markedly decreased in size. Lymph node on image 58 in the inferior mediastinum measures 16 x 11 mm, previously this measured 33 x 19 mm. Left paratracheal and right paratracheal lymph nodes decreased in size compared to prior exam. Lower Neck and Chest Wall: No axillary lymphadenopathy. No supraclavicular lymphadenopathy. Thyroid gland is unremarkable. Bones: No suspicious osseous lesion identified. CT ABDOMEN AND PELVIS: Upper Abdomen: Numerous hepatic metastasis which appear less distinct on the current exam. Lesion in the right lobe of liver image 95 measures 23 x 20 mm, present measuring 30 x 22 mm. Lesion in the left lobe of liver image 98 measures 26 x 19 mm, previously measuring 29 x 19 mm. Lesion in the inferior right lobe liver image 103 measures 24 x 21 mm, previously measuring 19 x 17 mm. No definite new liver lesion clearly identified. Spleen is unchanged. Pancreas is unremarkable. Mixed response of upper abdominal lymphadenopathy. There is a retrocaval lymph node axial image 117 which has increased in size showing 31 x 15 mm, previously measuring 30 x 9 mm. Aortocaval lymph node axial image 117 measures 20 x 14 mm, previously measuring 23 x 16 mm. Previous portal caval lymph node is no longer clearly measurable. A left paraceliac lymph node has decreased in size image 112 measuring 10 x 12 mm, previously measuring 20 x 28 mm. Additional retroperitoneal lymph nodes appear similar in size or slightly decreased. No pancreatic abnormality. Retroperitoneum: No hydronephrosis. Stable adrenal glands. Bowel and Peritoneum: Nonobstructive bowel gas pattern. No free air. No significant free fluid. Extensive sigmoid diverticulosis. Extensive vascular calcifications of the aorta. Pelvis: No pelvic lymphadenopathy identified. Bladder is unremarkable. Prostate is mildly enlarged with calcifications. Bones: The lytic lesion in the right acetabulum is difficult to visualize. There is a small incomplete fracture along the roof of the acetabulum suggesting a pathologic fracture. Clinical correlation recommended. No new osseous lesion clearly identified. CHEST: 1. New branching nodularity in the left lower lobe suspicious for new metastatic disease or possible lymphangitic carcinomatosis. 2. Numerous previously identified nodules in the lungs bilaterally have decreased in size in the interval. Some of the nodules are no longer clearly visualized. The mediastinal lymphadenopathy has markedly decreased in the interval compatible with response to therapy. No progressive lymphadenopathy identified. Overall mixed response with new nodularity in the superior segment left lower lobe. 3. Marked improvement in nodular thickening of the mid and lower esophagus compared to previous exam. ABDOMEN/PELVIS: 1. New incomplete pathologic fracture along the roof of the right acetabulum. Correlate clinically. 2. Extensive hepatic metastatic disease with many of the nodules measuring stable or slightly decreased in size. A few nodules measure slightly larger.  3. Next response of upper abdominal lymphadenopathy with a few lymph nodes measuring slightly larger and others measuring decreased in size in comparison to prior exam. 4. Extensive diverticulosis without diverticulitis. 5. No new osseous lesion identified.

## 2023-02-10 ENCOUNTER — EMERGENCY (EMERGENCY)
Facility: HOSPITAL | Age: 88
LOS: 1 days | Discharge: HOME | End: 2023-02-10
Attending: EMERGENCY MEDICINE | Admitting: EMERGENCY MEDICINE
Payer: MEDICARE

## 2023-02-10 VITALS
DIASTOLIC BLOOD PRESSURE: 83 MMHG | OXYGEN SATURATION: 100 % | SYSTOLIC BLOOD PRESSURE: 156 MMHG | HEART RATE: 70 BPM | RESPIRATION RATE: 16 BRPM | TEMPERATURE: 98 F

## 2023-02-10 VITALS
RESPIRATION RATE: 17 BRPM | OXYGEN SATURATION: 99 % | DIASTOLIC BLOOD PRESSURE: 70 MMHG | HEART RATE: 58 BPM | SYSTOLIC BLOOD PRESSURE: 138 MMHG | TEMPERATURE: 97 F

## 2023-02-10 DIAGNOSIS — Z90.710 ACQUIRED ABSENCE OF BOTH CERVIX AND UTERUS: Chronic | ICD-10-CM

## 2023-02-10 DIAGNOSIS — Z98.89 OTHER SPECIFIED POSTPROCEDURAL STATES: Chronic | ICD-10-CM

## 2023-02-10 DIAGNOSIS — M20.40 OTHER HAMMER TOE(S) (ACQUIRED), UNSPECIFIED FOOT: Chronic | ICD-10-CM

## 2023-02-10 LAB
ALBUMIN SERPL ELPH-MCNC: 4 G/DL — SIGNIFICANT CHANGE UP (ref 3.3–5)
ALP SERPL-CCNC: 64 U/L — SIGNIFICANT CHANGE UP (ref 40–120)
ALT FLD-CCNC: 15 U/L — SIGNIFICANT CHANGE UP (ref 4–33)
ANION GAP SERPL CALC-SCNC: 7 MMOL/L — SIGNIFICANT CHANGE UP (ref 7–14)
ANISOCYTOSIS BLD QL: SIGNIFICANT CHANGE UP
AST SERPL-CCNC: 19 U/L — SIGNIFICANT CHANGE UP (ref 4–32)
BASOPHILS # BLD AUTO: 0.03 K/UL — SIGNIFICANT CHANGE UP (ref 0–0.2)
BASOPHILS NFR BLD AUTO: 0.9 % — SIGNIFICANT CHANGE UP (ref 0–2)
BILIRUB SERPL-MCNC: 0.8 MG/DL — SIGNIFICANT CHANGE UP (ref 0.2–1.2)
BUN SERPL-MCNC: 20 MG/DL — SIGNIFICANT CHANGE UP (ref 7–23)
CALCIUM SERPL-MCNC: 9.7 MG/DL — SIGNIFICANT CHANGE UP (ref 8.4–10.5)
CHLORIDE SERPL-SCNC: 106 MMOL/L — SIGNIFICANT CHANGE UP (ref 98–107)
CO2 SERPL-SCNC: 29 MMOL/L — SIGNIFICANT CHANGE UP (ref 22–31)
CREAT SERPL-MCNC: 0.88 MG/DL — SIGNIFICANT CHANGE UP (ref 0.5–1.3)
EGFR: 64 ML/MIN/1.73M2 — SIGNIFICANT CHANGE UP
EOSINOPHIL # BLD AUTO: 0.11 K/UL — SIGNIFICANT CHANGE UP (ref 0–0.5)
EOSINOPHIL NFR BLD AUTO: 3.6 % — SIGNIFICANT CHANGE UP (ref 0–6)
GLUCOSE SERPL-MCNC: 97 MG/DL — SIGNIFICANT CHANGE UP (ref 70–99)
HCT VFR BLD CALC: 33.6 % — LOW (ref 34.5–45)
HGB BLD-MCNC: 10.8 G/DL — LOW (ref 11.5–15.5)
IANC: 1.83 K/UL — SIGNIFICANT CHANGE UP (ref 1.8–7.4)
LIDOCAIN IGE QN: 28 U/L — SIGNIFICANT CHANGE UP (ref 7–60)
LYMPHOCYTES # BLD AUTO: 0.29 K/UL — LOW (ref 1–3.3)
LYMPHOCYTES # BLD AUTO: 9.7 % — LOW (ref 13–44)
MACROCYTES BLD QL: SIGNIFICANT CHANGE UP
MAGNESIUM SERPL-MCNC: 2 MG/DL — SIGNIFICANT CHANGE UP (ref 1.6–2.6)
MANUAL SMEAR VERIFICATION: SIGNIFICANT CHANGE UP
MCHC RBC-ENTMCNC: 29.3 PG — SIGNIFICANT CHANGE UP (ref 27–34)
MCHC RBC-ENTMCNC: 32.1 GM/DL — SIGNIFICANT CHANGE UP (ref 32–36)
MCV RBC AUTO: 91.1 FL — SIGNIFICANT CHANGE UP (ref 80–100)
MONOCYTES # BLD AUTO: 0.29 K/UL — SIGNIFICANT CHANGE UP (ref 0–0.9)
MONOCYTES NFR BLD AUTO: 9.7 % — SIGNIFICANT CHANGE UP (ref 2–14)
NEUTROPHILS # BLD AUTO: 2.06 K/UL — SIGNIFICANT CHANGE UP (ref 1.8–7.4)
NEUTROPHILS NFR BLD AUTO: 69.9 % — SIGNIFICANT CHANGE UP (ref 43–77)
OVALOCYTES BLD QL SMEAR: SLIGHT — SIGNIFICANT CHANGE UP
PLAT MORPH BLD: NORMAL — SIGNIFICANT CHANGE UP
PLATELET # BLD AUTO: 176 K/UL — SIGNIFICANT CHANGE UP (ref 150–400)
PLATELET COUNT - ESTIMATE: NORMAL — SIGNIFICANT CHANGE UP
POIKILOCYTOSIS BLD QL AUTO: SLIGHT — SIGNIFICANT CHANGE UP
POLYCHROMASIA BLD QL SMEAR: SLIGHT — SIGNIFICANT CHANGE UP
POTASSIUM SERPL-MCNC: 4.6 MMOL/L — SIGNIFICANT CHANGE UP (ref 3.5–5.3)
POTASSIUM SERPL-SCNC: 4.6 MMOL/L — SIGNIFICANT CHANGE UP (ref 3.5–5.3)
PROT SERPL-MCNC: 7.3 G/DL — SIGNIFICANT CHANGE UP (ref 6–8.3)
RBC # BLD: 3.69 M/UL — LOW (ref 3.8–5.2)
RBC # FLD: 15.3 % — HIGH (ref 10.3–14.5)
RBC BLD AUTO: ABNORMAL
SMUDGE CELLS # BLD: PRESENT — SIGNIFICANT CHANGE UP
SODIUM SERPL-SCNC: 142 MMOL/L — SIGNIFICANT CHANGE UP (ref 135–145)
TROPONIN T, HIGH SENSITIVITY RESULT: 12 NG/L — SIGNIFICANT CHANGE UP
TROPONIN T, HIGH SENSITIVITY RESULT: 14 NG/L — SIGNIFICANT CHANGE UP
VARIANT LYMPHS # BLD: 6.2 % — HIGH (ref 0–6)
WBC # BLD: 2.95 K/UL — LOW (ref 3.8–10.5)
WBC # FLD AUTO: 2.95 K/UL — LOW (ref 3.8–10.5)

## 2023-02-10 PROCEDURE — 99053 MED SERV 10PM-8AM 24 HR FAC: CPT

## 2023-02-10 PROCEDURE — 71046 X-RAY EXAM CHEST 2 VIEWS: CPT | Mod: 26

## 2023-02-10 PROCEDURE — 99284 EMERGENCY DEPT VISIT MOD MDM: CPT

## 2023-02-10 PROCEDURE — 73060 X-RAY EXAM OF HUMERUS: CPT | Mod: 26,LT

## 2023-02-10 PROCEDURE — 99285 EMERGENCY DEPT VISIT HI MDM: CPT

## 2023-02-10 PROCEDURE — 73030 X-RAY EXAM OF SHOULDER: CPT | Mod: 26,LT

## 2023-02-10 RX ORDER — LIDOCAINE 4 G/100G
1 CREAM TOPICAL ONCE
Refills: 0 | Status: COMPLETED | OUTPATIENT
Start: 2023-02-10 | End: 2023-02-10

## 2023-02-10 RX ORDER — ACETAMINOPHEN 500 MG
650 TABLET ORAL ONCE
Refills: 0 | Status: COMPLETED | OUTPATIENT
Start: 2023-02-10 | End: 2023-02-10

## 2023-02-10 RX ADMIN — Medication 650 MILLIGRAM(S): at 08:55

## 2023-02-10 RX ADMIN — LIDOCAINE 1 PATCH: 4 CREAM TOPICAL at 08:54

## 2023-02-10 NOTE — ED PROVIDER NOTE - OBJECTIVE STATEMENT
Patient is a 87 year-old-female with history of HLD, breast cancer and GERD presents with L shoulder pain. Patient states that she woke up this morning with pain in the L shoulder, radiates to the L forearm. Reports that she also felt a little funny at the time. Denies fever, chills, nausea, vomiting, shortness of breath, abdominal pain, urinary/bowel complaints. Patient took aspirin this morning.   Cardiologist: Dr. Bernard Nettles

## 2023-02-10 NOTE — CONSULT NOTE ADULT - ASSESSMENT
86 yo F w/ PMH gastric ulcer, HTN, HLD who presents w/ left shoulder pain.     #Left shoulder pain   Atypical symptoms, low suspicion for ACS or PE or dissection   -Recommend f/u 2nd troponin   -Please obtain outpatient records of her echo

## 2023-02-10 NOTE — ED PROVIDER NOTE - CARE PROVIDER_API CALL
Bernard Nettles (MD)  Cardiovascular Disease  [address] [address]  Phone: [phone]  Fax: [phone]  Follow Up Time:

## 2023-02-10 NOTE — CONSULT NOTE ADULT - SUBJECTIVE AND OBJECTIVE BOX
Patient seen and evaluated at bedside    Chief Complaint:    HPI:  86 yo F w/ PMH gastric ulcer, HTN, HLD who presents w/ left shoulder pain. Patient woke up with left shoulder pain radiating down to left elbow. Denied trauma or worsening with movement. Denied chest pain or SOB. Resolved after 2-3 hours. Took 2 baby aspirins at home. Denies any chest pain or SOB on exertion. Had stress test in 2019 which was negative per patient. Per ED after talking with her cardiologist Dr. Casarez, she has a rate related LBBB in her previous EKG. Had an echo done 1 week ago, reportedly normal. Denies hx of smoking, drug use. Denies family hx of cardiac disease.     In the ED, VSS.     PMHx:   HLD (hyperlipidemia)    GERD (gastroesophageal reflux disease)    Uterine leiomyoma    Malignant neoplasm of breast    Hammer toes of both feet    Osteoarthritis        PSHx:   H/O hammer toe correction    History of hysterectomy    H/O total hysterectomy    Acquired hammer toe        Allergies:  No Known Allergies      Home Meds:    Current Medications:       FAMILY HISTORY:      Social History:  Smoking History:  Alcohol Use:  Drug Use:    REVIEW OF SYSTEMS:  As above       Physical Exam:  T(F): 97.8 (02-10), Max: 97.8 (02-10)  HR: 70 (02-10) (70 - 70)  BP: 156/83 (02-10) (156/83 - 156/83)  RR: 16 (02-10)  SpO2: 100% (02-10)  GENERAL: No acute distress   CHEST/LUNG: Clear to auscultation bilaterally; No wheeze, equal breath sounds bilaterally   HEART: Regular rate and rhythm; No murmurs, rubs, or gallops  EXTREMITIES:  No clubbing, cyanosis, or edema  PSYCH: Nl behavior, nl affect  NEUROLOGY: AAOx3, non-focal   SKIN: Normal color, No rashes or lesions  LINES:    Cardiovascular Diagnostic Testing:    ECG:   Sinus rhythm w/ LBBB     Echo:      Stress Testing:    Cath:    Imaging:    CXR:      Labs: Personally reviewed                        10.8   2.95  )-----------( 176      ( 10 Feb 2023 09:00 )             33.6     02-10    142  |  106  |  20  ----------------------------<  97  4.6   |  29  |  0.88    Ca    9.7      10 Feb 2023 09:00  Mg     2.00     02-10    TPro  7.3  /  Alb  4.0  /  TBili  0.8  /  DBili  x   /  AST  19  /  ALT  15  /  AlkPhos  64  02-10

## 2023-02-10 NOTE — ED PROVIDER NOTE - PATIENT PORTAL LINK FT
You can access the FollowMyHealth Patient Portal offered by [facility] by registering at the following website: [email or web address]. By joining [employer]’s FollowMyHealth portal, you will also be able to view your health information using other applications (apps) compatible with our system.

## 2023-02-10 NOTE — ED ADULT NURSE NOTE - OBJECTIVE STATEMENT
Patient received in stretcher. AOX4. Respirations even and unlabored. Spontaneous movement of all extremities noted. Presents to ER c/o LUE pain starting this AM. As per patient she woke up with pain- has never experienced this type of pain before. L upper arm non radiating. No obvious deformity noted, denies trauma or strenuous activity. + distal pulse +/= sensation Denies CP or SOB reports taking two ASA at home prior to arrival to ER. IV placed. Labs drawn. Medicated as per MD orders. Comfort and safety maintained. All current care needs met. Care plan continued Juju ALCOCER

## 2023-02-10 NOTE — ED ADULT TRIAGE NOTE - HEART RATE (BEATS/MIN)
1958    Chief Complaint   Patient presents with    Hyperlipidemia     2 months     Hypertension    Hypothyroidism    Other     s/p cubital tunnel surgery     Pt is a 59 y.o. male who presents for to review testing and 2 month follow up of chronic issues. He had Left elbow ulnar nerve decompression vs anterior transposition. He did well with surgery - just mild numbness in left 5th finger. He was diagnosed with right homonymous hemianopsia by Dr. Yefri Bell - CT head with orbits 1/7/22 - moderate mucosal thickening in left ethmoid and right maxillary sinuses  - normal pituitary. Patient has had this for some time, unable to tell me when it started. He has cataract surgery coming up on 2/9/22. Will give Zyrtec and Flonase for sinuses - his only complaint is occasional pressure/headache. No acute infection per symptoms - no rhinorrhea. No further complaints of headache or head pressure    Sleep apnea - has CPAP but hasn't used in 4-5 years. DM - resolved with weight loss - 245# -> 192# ->184# now. He saw dietician. 1-3 meals a day. States food tastes flat, may be due to dry mouth with new psych meds. Loss of appetite - will address with psych. Lack of motivation. Need to contact [facility] to report this - also think he may have TD with tongue movements. [place] and irritated. Obesity - BMI 34 -> 32.59. He is having a hard time figuring out what to eat and what to shop for. He is not eating well right now. Worry that he is not getting enough protein. Reviewed options of meat, cheese, nuts, peanut butter as options for protein. Explained that he needs to eat protein to heal well after surgery. He has a referral in for dietician. But as he doesn't read and has poor focus/attention - it is difficult to educate and have him retain information. I have care coordinators/social work helping him with transportation to multiple specialist visits and keeping him on track.   But he really last appt and got into argument with them. Encouraged him to get back with Mikaela Timmy to work on medication. He couldn't afford Trintellix so not on that. He has Halcion at pharmacy waiting but he wasn't aware of it. Will make appointment with Mikaela Warner. Did MMSE - 13/30 but has developmental delay. He has had a totally different personality the last several months (3/2021). Inappropriate language, much more talkative, more emotional.  Check MRI brain. We did a complete blood work 2 months ago, when noticed the change. Need to reschedule MRI as missed appt. Back pain - lidocaine patch not helping. Reviewed x-ray - spasm on left, and facet disease. Try PT and muscle relaxant/Mobic. Suggested warm moist heat. May need MRI and pain clinic referral if not improving. He made it to PT x 2 then Nils Olvera took him to [facility]. He needs to set up appointment again with PT now that he is back. He cancelled appts. Left knee pain and posterior edema - did doppler to make sure no DVT and was negative 7/20/21. He has a bill at [facility] due to fraud that his ex-wife used his insurance card for her significant other and didn't pay bill. He has seen ortho in [place] previously. Will check x-ray and refer back to Dr. Yohannes Caraballo at [facility] in [place]. He did MRI and scope of left knee in 2015. A major issue for patient right now is that he will walk for hours even with the knee pain. Told him to give it a break. Suggested Tylenol for pain but no more than 3 gm a day, along with ibuprofen, he has stopped Mobic. He thinks ibuprofen works better than Mobic. Told him to stop this if using prednisone. History of DVT with worsening leg pain/edema off anticoagulation - Reviewed venous doppler 7/20/21 - no DVT- trace edema likely from OA knee. He is not wearing CPAP as not helping sweating episodes. Again suggested that he get back to using this.     Patient Active Problem List   Diagnosis    Hyperlipidemia    Hypertension    Right leg pain    Right ankle pain    Elevated LFTs    GERD (gastroesophageal reflux disease)    Fatigue    Intermittent explosive disorder    Witnessed apneic spells    Snoring    Sleep disturbance    Sleep difficulties    Nocturnal leg movements    Restless sleeper    Non-restorative sleep    Obesity (BMI 30-39. 9)    Daytime somnolence    Acquired hypothyroidism    Anxiety    Reactive depression     Past Medical History:   Diagnosis Date    Chronic leg pain     Diabetes ([address] 05/2018    resolved with weight loss.     GERD (gastroesophageal reflux disease)     History of deep venous thrombosis (DVT) of distal vein of left lower extremity 2020    Hyperlipidemia     Hypertension     Hypothyroidism     Right homonymous hemianopsia     Sleep apnea     doesn't use CPAP    TIA (transient ischemic attack) 2016    Dr. Tyree Garcia        Past Surgical History:   Procedure Laterality Date    ANKLE SURGERY Right 2000    KNEE ARTHROSCOPY Left 9-11-15    Torn cartliage    LEG SURGERY Right 2000    TX COLSC FLX W/RMVL OF TUMOR POLYP LESION SNARE TQ  7/16/2017    COLONOSCOPY POLYPECTOMY SNARE/COLD BIOPSY performed by Lorenza Robledo MD at [facility]    TX EGD TRANSORAL BIOPSY SINGLE/MULTIPLE N/A 9/27/2017    EGD BIOPSY performed by Sujey Melgar MD at [address] [place] [address]  7/16/2017    EGD DIAGNOSTIC ONLY performed by Lorenza Robledo MD at [facility]       Current Outpatient Medications   Medication Sig Dispense Refill    metoprolol succinate (TOPROL XL) 50 MG extended release tablet TAKE 1 TABLET BY MOUTH EVERY DAY 90 tablet 1    levothyroxine (SYNTHROID) 50 MCG tablet Take 1 tablet by mouth daily 90 tablet 1    losartan (COZAAR) 50 MG tablet TAKE 1 TABLET BY MOUTH EVERY DAY 90 tablet 1    omeprazole (PRILOSEC) 20 MG delayed release capsule Take 1 capsule by mouth every morning (before breakfast) 90 capsule 1    atorvastatin (LIPITOR) 80 MG tablet Take 1 tablet by mouth daily 30 tablet 5    VORTIoxetine HBr (TRINTELLIX) 20 MG TABS tablet Take 20 mg by mouth daily      fluticasone (FLONASE) 50 MCG/ACT nasal spray 2 sprays by Each Nostril route daily 16 g 5    albuterol sulfate  (90 Base) MCG/ACT inhaler Inhale 2 puffs into the lungs every 6 hours as needed for Wheezing 1 Inhaler 3    Multiple Vitamins-Minerals (THERAPEUTIC MULTIVITAMIN-MINERALS) tablet Take 1 tablet by mouth daily      Misc Natural Products (OSTEO BI-FLEX ADV TRIPLE ST PO) Take by mouth daily       Multiple Vitamins-Minerals (OCUVITE-LUTEIN PO) Take by mouth daily       risperiDONE (RISPERDAL) 1 MG tablet Take 1 mg by mouth every evening       acetaminophen (TYLENOL) 500 MG tablet Take 1,000 mg by mouth every 6 hours as needed for Pain      cetirizine (ZYRTEC) 10 MG tablet Take 1 tablet by mouth daily (Patient not taking: Reported on 2/14/2022) 90 tablet 1     No current facility-administered medications for this visit. No Known Allergies    Review of Systems - General ROS: negative for - chills or fever  Psychological ROS: positive for - irritability, increased sleep, denies anxiety  - he has restarted seeing [facility] center. Hematological and Lymphatic ROS: No history of bleeding disorder, positive h/o DVT. Respiratory ROS: no cough, shortness of breath, or wheezing - flare in past with mowing lawn.   No issues now   Cardiovascular ROS: no chest pain, no dyspnea on exertion - cath neg 1/2017  Gastrointestinal ROS: no abdominal pain, no change in bowel habits, or black or bloody stools  Genito-Urinary ROS: no dysuria, trouble voiding, or hematuria  Musculoskeletal ROS: negative for - joint pain, muscle pain or muscular weakness   Neurological ROS: negative for - seizures, headache  Dermatological ROS: negative for - rash or skin lesion changes    Blood pressure 120/82, pulse 70, temperature 97.4 °F (36.3 °C), height 5' 3\" (1.6 m), weight 184 lb (83.5 kg). Physical Examination: General appearance -alert, well appearing, and in no distress  Neck - supple, no significant adenopathy  Chest - clear to auscultation, no wheezes, rales or rhonchi, symmetric air entry  Heart - normal rate, regular rhythm, normal S1, S2, no murmurs, rubs, clicks or gallops  Abdomen -soft, nontender, non-distended, obese  Neurological - alert, oriented, normal speech  Extremities - peripheral pulses normal, no clubbing or cyanosis, trace-+1 edema left LE edema  Bilateral knee crepitus. Right ankle anterior medial hardware palpable. Skin - warm and dry    Diagnostic data:  Reviewed multiple labs from 1/2022.     Results for orders placed or performed during the hospital encounter of 01/28/22   COVID-19, Rapid    Specimen: Nasopharyngeal Swab   Result Value Ref Range    SARS-CoV-2, NAAT NOT  DETECTED NOT DETECTED   CBC auto differential   Result Value Ref Range    WBC 8.4 4.8 - 10.8 thou/mm3    RBC 5.51 4.70 - 6.10 mill/mm3    Hemoglobin 17.1 14.0 - 18.0 gm/dl    Hematocrit 50.2 42.0 - 52.0 %    MCV 91.1 80.0 - 94.0 fL    MCH 31.0 26.0 - 33.0 pg    MCHC 34.1 32.2 - 35.5 gm/dl    RDW-CV 13.2 11.5 - 14.5 %    RDW-SD 43.9 35.0 - 45.0 fL    Platelets 999 654 - 704 thou/mm3    MPV 13.5 (H) 9.4 - 12.4 fL    Seg Neutrophils 75.2 %    Lymphocytes 17.8 %    Monocytes 5.6 %    Eosinophils 0.7 %    Basophils 0.2 %    Immature Granulocytes 0.5 %    Segs Absolute 6.3 1.8 - 7.7 thou/mm3    Lymphocytes Absolute 1.5 1.0 - 4.8 thou/mm3    Monocytes Absolute 0.5 0.4 - 1.3 thou/mm3    Eosinophils Absolute 0.1 0.0 - 0.4 thou/mm3    Basophils Absolute 0.0 0.0 - 0.1 thou/mm3    Immature Grans (Abs) 0.04 0.00 - 0.07 thou/mm3    nRBC 0 /100 wbc   Comprehensive Metabolic Panel w/ Reflex to MG   Result Value Ref Range    Glucose 96 70 - 108 mg/dL    CREATININE 0.7 0.4 - 1.2 mg/dL    BUN 11 7 - 22 mg/dL    Sodium 138 135 - 145 meq/L    Potassium reflex Magnesium 4.4 3.5 - 5.2 meq/L    Chloride 100 98 - 111 meq/L    CO2 25 23 - 33 meq/L    Calcium 9.4 8.5 - 10.5 mg/dL    AST 24 5 - 40 U/L    Alkaline Phosphatase 69 38 - 126 U/L    Total Protein 7.0 6.1 - 8.0 g/dL    Albumin 4.6 3.5 - 5.1 g/dL    Total Bilirubin 0.3 0.3 - 1.2 mg/dL    ALT 13 11 - 66 U/L   Urine Drug Screen   Result Value Ref Range    AMPHETAMINE+METHAMPHETAMINE URINE SCREEN Negative NEGATIVE    Barbiturate Quant, Ur Negative NEGATIVE    Benzodiazepine Quant, Ur Negative NEGATIVE    Cannabinoid Quant, Ur Negative NEGATIVE    Cocaine Metab Quant, Ur Negative NEGATIVE    Opiates, Urine Negative NEGATIVE    Oxycodone Negative NEGATIVE    PCP Quant, Ur Negative NEGATIVE   Ethanol   Result Value Ref Range    ETHYL ALCOHOL, SERUM < 7.94 2.53 %   Salicylate Level   Result Value Ref Range    Salicylate, Serum < 0.3 (L) 2.0 - 10.0 mg/dL   Acetaminophen level   Result Value Ref Range    Acetaminophen Level < 5.0 0.0 - 20.0 ug/mL   TSH with Reflex   Result Value Ref Range    TSH 0.439 0.400 - 4.200 uIU/mL   Anion Gap   Result Value Ref Range    Anion Gap 13.0 8.0 - 16.0 meq/L   Glomerular Filtration Rate, Estimated   Result Value Ref Range    Est, Glom Filt Rate >90 ml/min/1.73m2   Osmolality   Result Value Ref Range    Osmolality Calc 274.9 (L) 275.0 - 300.0 mOsmol/kg     Assessment and Plan:      Diagnosis Orders   1. Mixed hyperlipidemia  atorvastatin (LIPITOR) 80 MG tablet    Lipid Panel    ALT   2. Essential hypertension  metoprolol succinate (TOPROL XL) 50 MG extended release tablet    losartan (COZAAR) 50 MG tablet   3. Acquired hypothyroidism  levothyroxine (SYNTHROID) 50 MCG tablet   4. Cubital tunnel syndrome on left     5. Gastroesophageal reflux disease, unspecified whether esophagitis present  omeprazole (PRILOSEC) 20 MG delayed release capsule   6. Obesity (BMI 30-39. 9)       Hyperlipidemia - back on Lipitor - due for Lipid panel and ALT at end of month. Hypertension - BP controlled. Continue medication as above.   Following BMP -1/2022 normal.     Hypothyroidism - TSH at goal 1/2022 - continue on levothyroxine 50 mcg daily. Cubital tunnel syndrome bilaterally - paresthesia - now off Neurontin. No issues with pain now just numbness in left 5th finger. GERD - doing well on omeprazole 20 mg daily. Likely exacerbated with prednisone and NSAID usage. Obesity- improving - BMI 32.59. Keep working on diet and exercise. Psych -need to call  at [facility] and report TD symptoms. Lung nodule - small <1 cm - recommended repeat CT in one year - 10/24/22. Follow up in 6/2022. Labs due end of month. Allergies: Patient has no known allergies. Dr. Brigido Benites [address]  [place], [address]    Phone number: [phone]  Fax number [phone]

## 2023-02-10 NOTE — ED PROVIDER NOTE - NSFOLLOWUPINSTRUCTIONS_ED_ALL_ED_FT
You were seen in the emergency department for left shoulder pain. Your workup in the emergency department includes bloodwork, ECG, xray of chest and evaluation by cardiology. The presumed diagnosis is left shoulder pain. You can find the results of all the tests in this discharge packet.     Please follow up with your primary care doctor within 48 hours for continuation of care.   Please follow up with Dr. Nettles next week for further management.     Return to the emergency department if you experience any new/concerning/worsening symptoms such as but not limited to: fever (>100.3F), intractable nausea, vomiting, chest pain, shortness of breath.

## 2023-02-10 NOTE — ED ADULT TRIAGE NOTE - CHIEF COMPLAINT QUOTE
Patient c/o L arm pain for one day. Denies recent injuries/heavy lifting. No numbness/tingling. No CP/SOB. PMH HLD.

## 2023-02-10 NOTE — ED PROVIDER NOTE - NSICDXPASTSURGICALHX_GEN_ALL_CORE_FT
PAST SURGICAL HISTORY:  Acquired hammer toe had surgery bilat feet in 2002 and 2003    H/O hammer toe correction     H/O total hysterectomy 1985    History of hysterectomy

## 2023-02-10 NOTE — ED PROVIDER NOTE - ATTENDING CONTRIBUTION TO CARE
DR. CHRISTIE, ATTENDING MD-  I performed a face to face bedside interview with the patient regarding history of present illness, review of symptoms and past medical history. I completed an independent physical exam.  I have discussed the patient's plan of care with the resident.   Documentation as above in the note.    87-year-old female history of hyperlipidemia here with atraumatic left shoulder pain since this morning.  Patient woke up with the discomfort.  She denies any trauma or overuse.  No prior history of such discomfort.  States that the pain radiates between her shoulder blades.  She had recent negative stress test 2 weeks ago by Dr. Hurtado.  She denies any shortness of breath nausea or diaphoresis.  Evaluate for bony abnormality calcific tendinitis atypical ACS.  Will obtain EKG CBC BMP troponin chest x-ray x-ray shoulder give pain medication, patient took 2 baby aspirin this morning prior to arrival.

## 2023-02-10 NOTE — CONSULT NOTE ADULT - ATTENDING COMMENTS
Low clinical suspicion for ACS. No plans for further inpatient cardiac testing. Please call us back with questions.

## 2023-02-10 NOTE — ED PROVIDER NOTE - PROGRESS NOTE DETAILS
Mitch PGY2  Initial trop 12, will repeat.   Discussed with Dr. Nettles - patient last had a stress test in 2019 and has a rate-related LBBB; ECG from a week ago was sinus with HR of 53. Consulted cards for further management. Mitch PGY2  Delta trop negative. Discussed with Dr. Nettles - patient had an ECHO last month which showed EF of 55-60% with mild diastolic dysfunction; can follow up with the patient next week. Updated house cards and they will see patient soon and give final recommendation. Mitch PGY2  Patient states that she has an appointment at [facility] and would like to leave prior to cardiology final recommendation. Will discharge with cards follow up Mitch PGY2  Delta trop negative. Discussed with Dr. Nettles - patient had an ECHO last month which showed EF of 55-60% with mild diastolic dysfunction; can follow up with the patient next week. Updated house cards and they will see patient soon and give final recommendation.Likely discharge

## 2023-02-10 NOTE — ED PROVIDER NOTE - PHYSICAL EXAMINATION
Vitals: I have reviewed the patients vital signs  General: Well dressed, well appearing, no acute distress  HEENT: Atraumatic, normocephalic, airway patent  Eyes: EOMI, tracking appropriately  Neck: no tracheal deviation, no JVD  Chest/Lungs: no trauma, symmetric chest rise, speaking in complete sentences, no WOB  Heart: skin and extremities well perfused, regular rate and rhythm  Abdomen: soft, nontender and nondistended   Neuro: A+Ox3, ambulating without difficulty, CN grossly intact  MSK: strength at baseline in all extremities, no muscle wasting or atrophy, good passive ROM of L shoulder/elbow   Skin: no cyanosis, no jaundice, no new emergent lesions

## 2023-02-10 NOTE — ED PROVIDER NOTE - NSICDXPASTMEDICALHX_GEN_ALL_CORE_FT
PAST MEDICAL HISTORY:  GERD (gastroesophageal reflux disease)     Hammer toes of both feet     HLD (hyperlipidemia)     Malignant neoplasm of breast right breast    Osteoarthritis     Uterine leiomyoma

## 2025-07-21 ENCOUNTER — NON-APPOINTMENT (OUTPATIENT)
Age: 89
End: 2025-07-21

## 2025-07-22 ENCOUNTER — APPOINTMENT (OUTPATIENT)
Dept: RADIATION ONCOLOGY | Facility: CLINIC | Age: 89
End: 2025-07-22